# Patient Record
Sex: MALE | Race: WHITE | NOT HISPANIC OR LATINO | Employment: FULL TIME | URBAN - METROPOLITAN AREA
[De-identification: names, ages, dates, MRNs, and addresses within clinical notes are randomized per-mention and may not be internally consistent; named-entity substitution may affect disease eponyms.]

---

## 2022-12-17 ENCOUNTER — APPOINTMENT (OUTPATIENT)
Dept: GENERAL RADIOLOGY | Facility: HOSPITAL | Age: 62
End: 2022-12-17

## 2022-12-17 ENCOUNTER — HOSPITAL ENCOUNTER (EMERGENCY)
Facility: HOSPITAL | Age: 62
Discharge: HOME | End: 2022-12-17
Attending: EMERGENCY MEDICINE | Admitting: EMERGENCY MEDICINE

## 2022-12-17 VITALS
WEIGHT: 239.86 LBS | BODY MASS INDEX: 34.34 KG/M2 | HEART RATE: 66 BPM | RESPIRATION RATE: 18 BRPM | DIASTOLIC BLOOD PRESSURE: 88 MMHG | TEMPERATURE: 98 F | SYSTOLIC BLOOD PRESSURE: 166 MMHG | OXYGEN SATURATION: 100 % | HEIGHT: 70 IN

## 2022-12-17 DIAGNOSIS — S93.601A SPRAIN OF RIGHT FOOT, INITIAL ENCOUNTER: Primary | ICD-10-CM

## 2022-12-17 PROCEDURE — 73650 X-RAY EXAM OF HEEL: CPT

## 2022-12-17 PROCEDURE — 73630 X-RAY EXAM OF FOOT: CPT

## 2022-12-17 PROCEDURE — 99282 EMERGENCY DEPT VISIT SF MDM: CPT

## 2022-12-17 NOTE — DISCHARGE INSTRUCTIONS
Elevate ice limit use.  Limit weightbearing.  Follow-up with one of the orthopedist listed above call Monday and set up appointment within the next week.  Tylenol ibuprofen.  Follow-up extremely important to rule out a ligamentous tear fracture into the calcaneus and potential immobilization and surgical issues.  Please follow-up as directed  Return for fever redness uncontrolled pain vomiting red hot swollen leg or any other new or worse problems or concerns.

## 2022-12-17 NOTE — ED PROVIDER NOTES
Subjective   History of Present Illness  Chief complaint pain in left foot and heel    History of present illness 62-year-old male previously healthy who states playing [employer] yesterday when he made a cut and he felt a pop in the bottom of his heel and foot.  Complains of severe pain sharp and stabbing throbbing worse with activity and weightbearing and better with rest.  Nonradiating continuous no numbness or tingling.  No other fall.  No knee pain or other leg pain no previous injury to this.  No other complaints or associated symptoms.        Review of Systems   Constitutional: Negative for chills and fever.   Cardiovascular: Negative for leg swelling.   Gastrointestinal: Negative for vomiting.   Skin: Negative for rash and wound.   Neurological: Negative for weakness and numbness.       No past medical history on file.  No health problem  No Known Allergies    No past surgical history on file.    No family history on file.    Social History     Socioeconomic History   • Marital status:    Social history no tobacco alcohol or drug        Objective   Physical Exam  Constitutional is a 62-year-old male awake alert no distress temperature is 98 heart rate 62 blood pressure 167/99 respirations 16 sats 90% room air.  Examination of that right foot he has tenderness to the bottom of the heel just at the insertion of the plantar fascia and into the medial aspect of the heel.  No crepitus or subcutaneous air there is no step-off or deformity.  Not red or hot or feverish there is mild swelling.  There is there is no other pain throughout the bottom of the foot.  Top of foot is nontender is not red or hot but is warm and dry good cap refill good and equal dorsalis pedis and posterior tibialis pulses.  Toes up-and-down occluding big toe dorsiflex plantarflex that difficulty the patient has no pain to the ankle no medial or lateral malleoli or tenderness.  The Achilles tendon is nontender.  He can dorsiflex and  plantarflex without difficulty.  You squeeze on the calf and the foot is downgoing.  The patient can lie on his belly I can squeeze on the cath was downgoing and the strength is equal and movement is a same between his right and left there is no evidence of Achilles tendon injury.  There is no proximal tib-fib or knee pain.  There is no palpable cords or Homans' sign or evidence of DVT nothing is red or hot or fevers there is no evidence to suggest a septic joint or cellulitis.  Pulses are great and he has an equal bilaterally foot warm dry no evidence of arterial compromise.  Procedures           ED Course    No results found for this or any previous visit.  XR Foot 3+ View Right    Result Date: 12/17/2022  No acute findings.  Electronically Signed By-Mitch Epps MD On:12/17/2022 1:46 PM This report was finalized on [id number] by  Mitch Epps MD.    XR Calcaneus 2+ View Right    Result Date: 12/17/2022  No acute fracture.  Electronically Signed By-Mitch Epps MD On:12/17/2022 1:45 PM This report was finalized on [id number] by  Mitch Epps MD.    Medications - No data to display                                         MDM  Number of Diagnoses or Management Options  Sprain of right foot, initial encounter: new and requires workup  Diagnosis management comments: Medical decision making.  Patient had x-rays obtained of the foot and calcaneus.  There was no acute fracture he does have some spurring noted at the bottom of the heel and some calcifications.  I suspect he has a ligamentous injury or even potentially hairline fracture.  At this point we considered CT but did not feel that was necessary tonight.  We will follow this up with the orthopedist or podiatrist we talked about the importance of this because he may need an MRI if he is not improving this will look at potential fracture versus a ligamentous injury or tear that may require surgery.  I do not see evidence of infection there is no evidence of  injury to the Lisfranc joint.  There is no tenderness of Lisfranc joint and there is no severe swelling.  I do not see any evidence of Achilles tendon rupture or any evidence of arterial or venous problems no evidence of DVT or an infected joint.  This is not a complete list of all possibilities.  He was placed in a cam walker and given crutches.  And he was given referral to podiatry and orthopedics and we stressed the importance of follow-up for further evaluation and potential CT and or MRI if not better to rule out ligamentous or hairline fracture.  He voices understanding he was discharged home in good condition stable unremarkable ER course.       Amount and/or Complexity of Data Reviewed  Tests in the radiology section of CPT®: reviewed    Risk of Complications, Morbidity, and/or Mortality  Presenting problems: low  Diagnostic procedures: low  Management options: low    Patient Progress  Patient progress: stable      Final diagnoses:   Sprain of right foot, initial encounter       ED Disposition  ED Disposition     ED Disposition   Discharge    Condition   Stable    Comment   --             SUNITA Andrade DPM  [address] [facility] [address]  [place] [address] [phone]    In 2 days  Call Monday to set up appointment for the next week    Nathan Huffman MD  [id number]  [place] [address]  [phone]    In 2 days  Call Monday and set up appointment for follow-up within the next week         Medication List      No changes were made to your prescriptions during this visit.          Cristobal Baltazar MD  12/17/22 9005